# Patient Record
Sex: FEMALE | Race: BLACK OR AFRICAN AMERICAN | NOT HISPANIC OR LATINO | Employment: UNEMPLOYED | URBAN - METROPOLITAN AREA
[De-identification: names, ages, dates, MRNs, and addresses within clinical notes are randomized per-mention and may not be internally consistent; named-entity substitution may affect disease eponyms.]

---

## 2022-02-26 ENCOUNTER — HOSPITAL ENCOUNTER (EMERGENCY)
Facility: HOSPITAL | Age: 57
Discharge: HOME | End: 2022-02-26
Attending: EMERGENCY MEDICINE
Payer: MEDICAID

## 2022-02-26 VITALS
DIASTOLIC BLOOD PRESSURE: 59 MMHG | HEIGHT: 66 IN | WEIGHT: 80 LBS | TEMPERATURE: 98 F | HEART RATE: 85 BPM | BODY MASS INDEX: 12.86 KG/M2 | SYSTOLIC BLOOD PRESSURE: 90 MMHG | OXYGEN SATURATION: 100 % | RESPIRATION RATE: 21 BRPM

## 2022-02-26 DIAGNOSIS — R06.02 SHORTNESS OF BREATH: ICD-10-CM

## 2022-02-26 DIAGNOSIS — J98.4 PULMONARY CAVITARY LESION: Primary | ICD-10-CM

## 2022-02-26 DIAGNOSIS — E87.6 HYPOKALEMIA: ICD-10-CM

## 2022-02-26 DIAGNOSIS — R06.02 SOB (SHORTNESS OF BREATH): ICD-10-CM

## 2022-02-26 DIAGNOSIS — M79.89 LEFT LEG SWELLING: ICD-10-CM

## 2022-02-26 DIAGNOSIS — B37.0 THRUSH: ICD-10-CM

## 2022-02-26 LAB
ALBUMIN SERPL BCP-MCNC: 1.7 G/DL (ref 3.5–5.2)
ALP SERPL-CCNC: 285 U/L (ref 55–135)
ALT SERPL W/O P-5'-P-CCNC: 18 U/L (ref 10–44)
ANION GAP SERPL CALC-SCNC: 11 MMOL/L (ref 8–16)
AST SERPL-CCNC: 17 U/L (ref 10–40)
BASOPHILS # BLD AUTO: 0.03 K/UL (ref 0–0.2)
BASOPHILS NFR BLD: 0.2 % (ref 0–1.9)
BILIRUB SERPL-MCNC: 0.8 MG/DL (ref 0.1–1)
BNP SERPL-MCNC: 74 PG/ML (ref 0–99)
BUN SERPL-MCNC: 6 MG/DL (ref 6–20)
CALCIUM SERPL-MCNC: 8.1 MG/DL (ref 8.7–10.5)
CHLORIDE SERPL-SCNC: 97 MMOL/L (ref 95–110)
CO2 SERPL-SCNC: 27 MMOL/L (ref 23–29)
CREAT SERPL-MCNC: 0.5 MG/DL (ref 0.5–1.4)
CTP QC/QA: YES
CTP QC/QA: YES
DIFFERENTIAL METHOD: ABNORMAL
EOSINOPHIL # BLD AUTO: 0 K/UL (ref 0–0.5)
EOSINOPHIL NFR BLD: 0.2 % (ref 0–8)
ERYTHROCYTE [DISTWIDTH] IN BLOOD BY AUTOMATED COUNT: 16.1 % (ref 11.5–14.5)
EST. GFR  (AFRICAN AMERICAN): >60 ML/MIN/1.73 M^2
EST. GFR  (NON AFRICAN AMERICAN): >60 ML/MIN/1.73 M^2
GLUCOSE SERPL-MCNC: 154 MG/DL (ref 70–110)
HCT VFR BLD AUTO: 27.7 % (ref 37–48.5)
HGB BLD-MCNC: 9.9 G/DL (ref 12–16)
IMM GRANULOCYTES # BLD AUTO: 0.05 K/UL (ref 0–0.04)
IMM GRANULOCYTES NFR BLD AUTO: 0.4 % (ref 0–0.5)
LYMPHOCYTES # BLD AUTO: 2.5 K/UL (ref 1–4.8)
LYMPHOCYTES NFR BLD: 20.1 % (ref 18–48)
MCH RBC QN AUTO: 32.9 PG (ref 27–31)
MCHC RBC AUTO-ENTMCNC: 35.7 G/DL (ref 32–36)
MCV RBC AUTO: 92 FL (ref 82–98)
MONOCYTES # BLD AUTO: 0.9 K/UL (ref 0.3–1)
MONOCYTES NFR BLD: 7.3 % (ref 4–15)
NEUTROPHILS # BLD AUTO: 8.9 K/UL (ref 1.8–7.7)
NEUTROPHILS NFR BLD: 71.8 % (ref 38–73)
NRBC BLD-RTO: 0 /100 WBC
PLATELET # BLD AUTO: 365 K/UL (ref 150–450)
PMV BLD AUTO: 10.4 FL (ref 9.2–12.9)
POC MOLECULAR INFLUENZA A AGN: NEGATIVE
POC MOLECULAR INFLUENZA B AGN: NEGATIVE
POTASSIUM SERPL-SCNC: 2.7 MMOL/L (ref 3.5–5.1)
PROT SERPL-MCNC: 5.3 G/DL (ref 6–8.4)
RBC # BLD AUTO: 3.01 M/UL (ref 4–5.4)
SARS-COV-2 RDRP RESP QL NAA+PROBE: NEGATIVE
SODIUM SERPL-SCNC: 135 MMOL/L (ref 136–145)
TROPONIN I SERPL DL<=0.01 NG/ML-MCNC: 0.01 NG/ML (ref 0–0.03)
WBC # BLD AUTO: 12.41 K/UL (ref 3.9–12.7)

## 2022-02-26 PROCEDURE — 83880 ASSAY OF NATRIURETIC PEPTIDE: CPT | Performed by: EMERGENCY MEDICINE

## 2022-02-26 PROCEDURE — 87502 INFLUENZA DNA AMP PROBE: CPT

## 2022-02-26 PROCEDURE — 99285 EMERGENCY DEPT VISIT HI MDM: CPT | Mod: 25

## 2022-02-26 PROCEDURE — U0002 COVID-19 LAB TEST NON-CDC: HCPCS | Performed by: EMERGENCY MEDICINE

## 2022-02-26 PROCEDURE — 25000003 PHARM REV CODE 250: Performed by: EMERGENCY MEDICINE

## 2022-02-26 PROCEDURE — 85025 COMPLETE CBC W/AUTO DIFF WBC: CPT | Performed by: EMERGENCY MEDICINE

## 2022-02-26 PROCEDURE — 93005 ELECTROCARDIOGRAM TRACING: CPT

## 2022-02-26 PROCEDURE — 84484 ASSAY OF TROPONIN QUANT: CPT | Performed by: EMERGENCY MEDICINE

## 2022-02-26 PROCEDURE — 93010 EKG 12-LEAD: ICD-10-PCS | Mod: ,,, | Performed by: INTERNAL MEDICINE

## 2022-02-26 PROCEDURE — 93010 ELECTROCARDIOGRAM REPORT: CPT | Mod: ,,, | Performed by: INTERNAL MEDICINE

## 2022-02-26 PROCEDURE — 80053 COMPREHEN METABOLIC PANEL: CPT | Performed by: EMERGENCY MEDICINE

## 2022-02-26 PROCEDURE — 87389 HIV-1 AG W/HIV-1&-2 AB AG IA: CPT | Performed by: EMERGENCY MEDICINE

## 2022-02-26 RX ORDER — MULTIVITAMIN/IRON/FOLIC ACID 18MG-0.4MG
1 TABLET ORAL DAILY
Qty: 60 TABLET | Refills: 2 | Status: SHIPPED | OUTPATIENT
Start: 2022-02-26

## 2022-02-26 RX ORDER — PANCRELIPASE 60000; 12000; 38000 [USP'U]/1; [USP'U]/1; [USP'U]/1
1 CAPSULE, DELAYED RELEASE PELLETS ORAL
Qty: 90 CAPSULE | Refills: 2 | Status: SHIPPED | OUTPATIENT
Start: 2022-02-26

## 2022-02-26 RX ORDER — POTASSIUM CHLORIDE 750 MG/1
TABLET, EXTENDED RELEASE ORAL
Qty: 32 TABLET | Refills: 0 | Status: SHIPPED | OUTPATIENT
Start: 2022-02-26

## 2022-02-26 RX ORDER — FERROUS SULFATE 325(65) MG
325 TABLET, DELAYED RELEASE (ENTERIC COATED) ORAL
COMMUNITY
End: 2022-02-26 | Stop reason: SDUPTHER

## 2022-02-26 RX ORDER — FLUCONAZOLE 200 MG/1
200 TABLET ORAL EVERY OTHER DAY
Qty: 3 TABLET | Refills: 0 | Status: SHIPPED | OUTPATIENT
Start: 2022-02-26 | End: 2022-03-03

## 2022-02-26 RX ORDER — POTASSIUM CHLORIDE 750 MG/1
20 TABLET, EXTENDED RELEASE ORAL 2 TIMES DAILY
COMMUNITY
End: 2022-02-26 | Stop reason: SDUPTHER

## 2022-02-26 RX ORDER — FERROUS SULFATE 325(65) MG
325 TABLET, DELAYED RELEASE (ENTERIC COATED) ORAL
Qty: 60 TABLET | Refills: 0 | Status: SHIPPED | OUTPATIENT
Start: 2022-02-28

## 2022-02-26 RX ORDER — PANCRELIPASE 60000; 12000; 38000 [USP'U]/1; [USP'U]/1; [USP'U]/1
1 CAPSULE, DELAYED RELEASE PELLETS ORAL
COMMUNITY
End: 2022-02-26 | Stop reason: SDUPTHER

## 2022-02-26 RX ADMIN — POTASSIUM BICARBONATE 50 MEQ: 977.5 TABLET, EFFERVESCENT ORAL at 01:02

## 2022-02-26 NOTE — ED TRIAGE NOTES
"Pt reports to the ED with C/O SOB, failure to thrive over the last year, thrush x 2.5 weeks, and left leg swelling. Pt reports "I have been using my inhalers at home but they do not seem to be helping. Pt reports "I have been feeling weak and my mouth is hurting me." Pt with +1 edema to the LLE and pain that started last night. Pt reports a 40 lb weight loss over the last year. Pt AAOx4, RESP easy and unlabored at rest. Cough present. ED workup in progress. Will monitor.   "

## 2022-02-26 NOTE — DISCHARGE INSTRUCTIONS
Please take your medicines exactly as prescribed.  Please return immediately if you get worse or if new problems develop.  Please follow-up with Dr. Borden next week.  Please follow-up on the 4th of March for your endobronchial ultrasound with Dr. Reji Hobbs as directed.

## 2022-03-02 LAB — HIV 1+2 AB+HIV1 P24 AG SERPL QL IA: NEGATIVE

## 2022-07-07 NOTE — ED PROVIDER NOTES
"Encounter Date: 2/26/2022    SCRIBE #1 NOTE: I, Silvia Xavier, am scribing for, and in the presence of,  Ashvin Novoa MD. I have scribed the following portions of the note - Other sections scribed: HPI, ROS.       History     Chief Complaint   Patient presents with    Shortness of Breath     Pt reports SOB which has progressively worsened in the last month. Pt denies CP. Pt reports increased weight loss, failure to thrive, for over a year. Reports, "I live alone and I need help. [facility] is not helping me."      Erin Catherine is a 56 y.o female with a PMHx of COPD, pancreatitis, goiter, and hyperlipidemia presenting to the ED with a chief complaint of leg swelling onset last night. The patient complains of sudden onset left lower leg swelling that is not associated with any leg pain. Additionally endorses her tongue turning red and developing white plaques inside of her mouth with associated sore throat. Reports chronic cough and shortness of breath but states they are unchanged from baseline. Reports past compliancy with Augmentin and Prednisone but states that both medications have since run out. Patient denies chest pain, fever, chills, abdominal pain, vomiting, diarrhea, dysuria, headaches, eye pain, ear pain, rash, or other associated symptoms.      The history is provided by the patient. No  was used.     Review of patient's allergies indicates:  No Known Allergies  Past Medical History:   Diagnosis Date    Alcohol-induced chronic pancreatitis     COPD (chronic obstructive pulmonary disease)      Past Surgical History:   Procedure Laterality Date    LUNG BIOPSY Right      No family history on file.  Social History     Tobacco Use    Smoking status: Current Every Day Smoker     Packs/day: 1.00     Years: 40.00     Pack years: 40.00     Types: Cigarettes    Smokeless tobacco: Never Used   Substance Use Topics    Alcohol use: Not Currently    Drug use: Never     Review of Systems "   Constitutional: Negative for chills, diaphoresis and fever.   HENT: Positive for mouth sores (white plaques) and sore throat. Negative for ear pain.    Eyes: Negative for pain.   Respiratory: Positive for cough (chronic) and shortness of breath (chronic).    Cardiovascular: Positive for leg swelling (left). Negative for chest pain.   Gastrointestinal: Negative for abdominal pain, diarrhea and vomiting.   Genitourinary: Negative for dysuria.   Musculoskeletal: Negative for arthralgias and myalgias.   Skin: Positive for color change (red tongue). Negative for rash.   Neurological: Negative for headaches.       Physical Exam     Initial Vitals [02/26/22 1056]   BP Pulse Resp Temp SpO2   102/63 97 20 98.2 °F (36.8 °C) 99 %      MAP       --         Physical Exam  The patient was examined specifically for the following:   General:No significant distress, Good color, Warm and dry. Head and neck:Scalp atraumatic, Neck supple. Neurological:Appropriate conversation, Gross motor deficits. Eyes:Conjugate gaze, Clear corneas. ENT: No epistaxis. Cardiac: Regular rate and rhythm, Grossly normal heart tones. Pulmonary: Wheezing, Rales. Gastrointestinal: Abdominal tenderness, Abdominal distention. Musculoskeletal: Extremity deformity, Apparent pain with range of motion of the joints. Skin: Rash.   The findings on examination were normal except for the following:  Patient has pale conjunctiva.  She is extremely thin she is coughing during the physical examination.  She has wheezing with forced expiration bilaterally.  There is no clinical evidence of respiratory distress.  Oxygen saturations are 99%.  The patient has some mild swelling of the left lower extremity.  There is some pitting edema in the left lower extremity.   ED Course   Procedures  Labs Reviewed   CBC W/ AUTO DIFFERENTIAL - Abnormal; Notable for the following components:       Result Value    RBC 3.01 (*)     Hemoglobin 9.9 (*)     Hematocrit 27.7 (*)     MCH 32.9  (*)     RDW 16.1 (*)     Gran # (ANC) 8.9 (*)     Immature Grans (Abs) 0.05 (*)     All other components within normal limits   COMPREHENSIVE METABOLIC PANEL - Abnormal; Notable for the following components:    Sodium 135 (*)     Potassium 2.7 (*)     Glucose 154 (*)     Calcium 8.1 (*)     Total Protein 5.3 (*)     Albumin 1.7 (*)     Alkaline Phosphatase 285 (*)     All other components within normal limits    Narrative:     POTASSIUM critical result(s) called and verbal readback obtained from   [place] [facility] [place] 12:52   TROPONIN I   B-TYPE NATRIURETIC PEPTIDE   HIV 1 / 2 ANTIBODY   SARS-COV-2 RDRP GENE   POCT INFLUENZA A/B MOLECULAR     EKG Readings: (Independently Interpreted)   This patient is in a normal sinus rhythm with a heart rate in 98. There are nonspecific ST segment changes.  There are no T-wave changes.  There are low voltage QRS complexes.  The axis is normal.  There is no evidence of acute myocardial infarction or malignant arrhythmia.         Imaging Results          US Lower Extremity Veins Left (Final result)  Result time 02/26/22 13:59:43    Final result by Artis Richter MD (02/26/22 13:59:43)                 Impression:      No evidence of deep venous thrombosis in the left lower extremity.      Electronically signed by: Artis Richter MD  Date:    02/26/2022  Time:    13:59             Narrative:    EXAMINATION:  US LOWER EXTREMITY VEINS LEFT    CLINICAL HISTORY:  Other specified soft tissue disorders    TECHNIQUE:  Duplex and color flow Doppler evaluation and graded compression of the left lower extremity veins was performed.    COMPARISON:  None    FINDINGS:  Left thigh veins: The common femoral, femoral, popliteal, upper greater saphenous, and deep femoral veins are patent and free of thrombus. The veins are normally compressible and have normal phasic flow and augmentation response.    Left calf veins: The visualized calf veins are patent.    Contralateral CFV: The  Detail Level: Zone contralateral (right) common femoral vein is patent and free of thrombus.    Miscellaneous: None                                X-Ray Chest AP Portable (Final result)  Result time 02/26/22 12:19:50    Final result by Shad Lopez MD (02/26/22 12:19:50)                 Impression:      1. Right upper lobe consolidative opacity with suspected cavitation, possibly infectious or neoplastic in nature.  This report was flagged in Epic as abnormal.      Electronically signed by: Shad Lopez MD  Date:    02/26/2022  Time:    12:19             Narrative:    EXAMINATION:  XR CHEST AP PORTABLE    CLINICAL HISTORY:  CHF;    TECHNIQUE:  Single frontal view of the chest was performed.    COMPARISON:  None.    FINDINGS:  Cardiac monitoring leads project over the bilateral hemithoraces.  There is mild fullness within the right paratracheal region, not well evaluated on this single view exam.  Hilar contours are unremarkable.  Cardiac silhouette is normal in size.  Lungs are overexpanded but symmetric.  There is mild suspected right upper lobe volume loss with and ill-defined area of consolidation and possible cavitation with surrounding interstitial opacities.  No pneumothorax or pleural effusion.  No free air beneath the diaphragm.  No acute osseous abnormalities.  There are degenerative changes of the right glenohumeral joint.  There is mild thoracic levoscoliosis.                                 Medications   potassium bicarbonate disintegrating tablet 50 mEq (50 mEq Oral Given 2/26/22 1342)     Medical Decision Making:   History:   Old Medical Records: I decided to obtain old medical records.  Independently Interpreted Test(s):   I have ordered and independently interpreted EKG Reading(s) - see prior notes  Clinical Tests:   Lab Tests: Ordered and Reviewed  Radiological Study: Ordered and Reviewed  Medical Tests: Ordered and Reviewed      Given the above this patient presents to the emergency room complaining of a  sore mouth.  The patient has plaques on her tongue.  I will treat her with Diflucan for thrush.  She was recently on prednisone and antibiotics.  The patient is being evaluated at [facility] for a large cavitary lesion of the right lung apex which is years old.  She is scheduled for an endobronchial ultrasound on the 4th of March.  I discussed this case with Dr. Juan Thomas, pulmonology who [place] [facility] [place]  The patient has no DVT in the left lower extremity.  She has a mild anemia that is chronic.  The patient reports that she is been noncompliant with all of her medicines because she has had a decreased appetite.  She is angry that her doctors will not increase her appetite.  I stressed the importance of being compliant with her medicines in taking her medicines as directed.  I stressed the importance of taking her potassium exactly as prescribed.  I will treat her for thrush.  I emphasized the importance of following up with her endobronchial ultrasound on the 4th of March as directed.               Scribe Attestation:   Scribe #1: I performed the above scribed service and the documentation accurately describes the services I performed. I attest to the accuracy of the note.                 Clinical Impression:   Final diagnoses:  [R06.02] SOB (shortness of breath)  [R06.02] Shortness of breath  [M79.89] Left leg swelling  [J98.4] Pulmonary cavitary lesion (Primary)  [B37.0] Thrush  [E87.6] Hypokalemia       I personally performed the services described in this documentation.  All medical record  entries made by the scribe are at my direction and in my presence.  Signed, Dr. Bright Novoa MD  02/26/22 5208       Ashvin Novoa MD  02/26/22 [id number]